# Patient Record
Sex: FEMALE | Race: WHITE | NOT HISPANIC OR LATINO | Employment: FULL TIME
[De-identification: names, ages, dates, MRNs, and addresses within clinical notes are randomized per-mention and may not be internally consistent; named-entity substitution may affect disease eponyms.]

---

## 2017-06-03 ENCOUNTER — HEALTH MAINTENANCE LETTER (OUTPATIENT)
Age: 52
End: 2017-06-03

## 2021-12-06 ENCOUNTER — NURSE TRIAGE (OUTPATIENT)
Dept: NURSING | Facility: CLINIC | Age: 56
End: 2021-12-06
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
## 2021-12-07 ENCOUNTER — HOSPITAL ENCOUNTER (EMERGENCY)
Facility: CLINIC | Age: 56
Discharge: HOME | End: 2021-12-07
Attending: EMERGENCY MEDICINE | Admitting: EMERGENCY MEDICINE
Payer: COMMERCIAL

## 2021-12-07 ENCOUNTER — APPOINTMENT (OUTPATIENT)
Dept: CT IMAGING | Facility: CLINIC | Age: 56
End: 2021-12-07
Attending: EMERGENCY MEDICINE
Payer: COMMERCIAL

## 2021-12-07 VITALS
SYSTOLIC BLOOD PRESSURE: 142 MMHG | TEMPERATURE: 98.9 F | RESPIRATION RATE: 18 BRPM | WEIGHT: 147 LBS | OXYGEN SATURATION: 99 % | DIASTOLIC BLOOD PRESSURE: 90 MMHG | HEART RATE: 85 BPM

## 2021-12-07 DIAGNOSIS — S09.90XA CLOSED HEAD INJURY, INITIAL ENCOUNTER: ICD-10-CM

## 2021-12-07 DIAGNOSIS — S16.1XXA ACUTE STRAIN OF NECK MUSCLE, INITIAL ENCOUNTER: ICD-10-CM

## 2021-12-07 PROCEDURE — 72125 CT NECK SPINE W/O DYE: CPT

## 2021-12-07 PROCEDURE — 70450 CT HEAD/BRAIN W/O DYE: CPT

## 2021-12-07 PROCEDURE — 99284 EMERGENCY DEPT VISIT MOD MDM: CPT | Mod: 25

## 2021-12-07 ASSESSMENT — ENCOUNTER SYMPTOMS
SPEECH DIFFICULTY: 0
ABDOMINAL PAIN: 0
CONFUSION: 0
NUMBNESS: 0
HEADACHES: 1
NECK PAIN: 1
WEAKNESS: 0
NAUSEA: 1
VOMITING: 0
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
## 2024-01-22 ENCOUNTER — HOSPITAL ENCOUNTER (OUTPATIENT)
Dept: MRI IMAGING | Facility: CLINIC | Age: 59
Discharge: HOME | End: 2024-01-22
Attending: PHYSICIAN ASSISTANT | Admitting: PHYSICIAN ASSISTANT
Payer: COMMERCIAL

## 2024-01-22 DIAGNOSIS — G89.29 OTHER CHRONIC PAIN: ICD-10-CM

## 2024-01-22 DIAGNOSIS — G89.29 CHRONIC ABDOMINAL PAIN: ICD-10-CM

## 2024-01-22 DIAGNOSIS — R10.9 CHRONIC ABDOMINAL PAIN: ICD-10-CM

## 2024-01-22 DIAGNOSIS — K52.9 CHRONIC DIARRHEA: ICD-10-CM

## 2024-01-22 DIAGNOSIS — Z87.19 HISTORY OF CROHN'S DISEASE: ICD-10-CM

## 2024-01-22 PROCEDURE — 250N000011 HC RX IP 250 OP 636

## 2024-01-22 PROCEDURE — 255N000002 HC RX 255 OP 636: Performed by: PHYSICIAN ASSISTANT

## 2024-01-22 PROCEDURE — 74183 MRI ABD W/O CNTR FLWD CNTR: CPT

## 2024-01-22 PROCEDURE — A9585 GADOBUTROL INJECTION: HCPCS | Performed by: PHYSICIAN ASSISTANT

## 2024-01-22 RX ORDER — GADOBUTROL 604.72 MG/ML
6.5 INJECTION INTRAVENOUS ONCE
Status: COMPLETED | OUTPATIENT
Start: 2024-01-22 | End: 2024-01-22

## 2024-01-22 RX ADMIN — GADOBUTROL 6.5 ML: 604.72 INJECTION INTRAVENOUS at 10:35

## 2024-01-22 RX ADMIN — GLUCAGON 1 MG: 1 INJECTION, POWDER, LYOPHILIZED, FOR SOLUTION INTRAMUSCULAR; INTRAVENOUS at 10:36

## 2025-04-26 ENCOUNTER — HOSPITAL ENCOUNTER (EMERGENCY)
Facility: CLINIC | Age: 60
Discharge: LEFT BEFORE DISCHARGE | End: 2025-04-26
Attending: PHYSICIAN ASSISTANT | Admitting: PHYSICIAN ASSISTANT
Payer: COMMERCIAL

## 2025-04-26 VITALS
OXYGEN SATURATION: 99 % | RESPIRATION RATE: 20 BRPM | DIASTOLIC BLOOD PRESSURE: 76 MMHG | SYSTOLIC BLOOD PRESSURE: 131 MMHG | HEART RATE: 90 BPM | TEMPERATURE: 97.9 F | HEIGHT: 67 IN | WEIGHT: 128.97 LBS | BODY MASS INDEX: 20.24 KG/M2

## 2025-04-26 DIAGNOSIS — R06.09 DYSPNEA ON EXERTION: ICD-10-CM

## 2025-04-26 DIAGNOSIS — R00.2 PALPITATIONS: ICD-10-CM

## 2025-04-26 PROCEDURE — 99282 EMERGENCY DEPT VISIT SF MDM: CPT

## 2025-04-26 ASSESSMENT — COLUMBIA-SUICIDE SEVERITY RATING SCALE - C-SSRS
2. HAVE YOU ACTUALLY HAD ANY THOUGHTS OF KILLING YOURSELF IN THE PAST MONTH?: NO
1. IN THE PAST MONTH, HAVE YOU WISHED YOU WERE DEAD OR WISHED YOU COULD GO TO SLEEP AND NOT WAKE UP?: NO
6. HAVE YOU EVER DONE ANYTHING, STARTED TO DO ANYTHING, OR PREPARED TO DO ANYTHING TO END YOUR LIFE?: NO

## 2025-04-26 NOTE — ED PROVIDER NOTES
Emergency Department Note      History of Present Illness     Chief Complaint   Palpitaions/SOB ( not abdominal pain) per patient      HPI   Lisa Washburn is a 59 year old female with a long standing history of Crohn's disease, chronic abdominal pain, and self reported short gut syndrome  presenting to the Emergency Department with concerns regarding heart palpitations. She has a history of recurrent Crohn's flare ups and at least a dozen surgeries with the majority of her colon resected due to crohns flares. Last significant surgery was 20 years ago as per document at recent visit on 3/18/25. She reports that she always has abdominal pain but developed a recent flare and was seen yesterday and started on steroids.  However her main concern today was regarding her electrolytes and her potassium. She reports she started having palpations and is concern she is having electrolyte abnormalities.     Patient reports palpitations, generalized weakness, shortness of breath, chills, and lightheadedness for the last 5 days. Symptoms are exacerbated by exertion. No chest pain, cough or fever. No vision changes, numbness. She has experienced similar symptoms previously due to low potassium. Clinical visit yesterday with a potassium of 3.3. Patient has a 20 year history of Crohn's disease and reports an increase in flare ups for the last month. Symptoms of this include abdominal pain, vomiting, and diarrhea. No blood with bowel movements. Denies fever or recent surgery. No history of cancer, diabetes, blood clots, heart problems, recent surgeries, or hypertension.     She had both upper and lower GI done in January 2024, both showed some Crohn's disease, but no acute treatment changes were suggested then.  They did talk about a biologic agent, but due to cost concerns documented in her chart she has not started this.    Per chart documentation she manages her pain with vicodin, gabapentin and occasional  "advil.    Patient's main concern is repletion her electrolytes.    Independent Historian   None    Review of External Notes   Office Visit 3/18/25  Office Visit: 4/26/25    Past Medical History     Medical History and Problem List   Crohn's disease  Borderline personality disorder  Short bowel syndrome  Hypokalemia  Chronic abdominal pain  Depression  Hyperlipidemia    Medications   Neurontin  Levothyroxine  Prednisone  Hydrocodone-acetaminophen    Surgical History   Colectomy  Small bowel resection  Reduce volvulus, hernia    Physical Exam     Patient Vitals for the past 24 hrs:   BP Temp Temp src Pulse Resp SpO2 Height Weight   04/26/25 0915 131/76 97.9  F (36.6  C) Temporal 90 20 99 % 1.702 m (5' 7\") 58.5 kg (128 lb 15.5 oz)     Physical Exam  Vitals and nursing note reviewed.   Constitutional:       Appearance: She is not diaphoretic.   HENT:      Head: Atraumatic.   Eyes:      General: No scleral icterus.     Extraocular Movements: Extraocular movements intact.   Cardiovascular:      Rate and Rhythm: Normal rate and regular rhythm.      Pulses:           Radial pulses are 2+ on the right side.      Heart sounds: S1 normal and S2 normal. No murmur heard.     No friction rub. No gallop.   Pulmonary:      Effort: Pulmonary effort is normal. No respiratory distress.      Breath sounds: Normal breath sounds. No stridor. No wheezing, rhonchi or rales.   Skin:     General: Skin is warm.      Coloration: Skin is not jaundiced.   Neurological:      Mental Status: She is alert and oriented to person, place, and time. Mental status is at baseline.      Cranial Nerves: No dysarthria or facial asymmetry.      Coordination: Coordination is intact.      Gait: Gait is intact.       Wingo through my exam the patient became more irritated and decided that she wanted to leave AGAINST MEDICAL ADVICE did not want me to continue with exam or the visit.  Thus I was unable to complete complete my full exam    Diagnostics     Lab " "Results   Labs Ordered and Resulted from Time of ED Arrival to Time of ED Departure - No data to display    Imaging   No orders to display       EKG       Independent Interpretation   None    ED Course      Medications Administered   Medications - No data to display    Procedures   Procedures     Discussion of Management   None    ED Course          Additional Documentation  None    Medical Decision Making / Diagnosis     CMS Diagnoses: None    MIPS       None    MDM   Lisa Washburn is a 59 year old female with history of chronic recurrent Crohn's with recent Crohn's flare with concerns regarding palpitation shortness of breath weakness.    Patient's main concern today was her potassium and electrolytes.  Although her potassium was 3.3 yesterday the rest of her electrolytes were within normal limits.  Partway through my exam of the patient I mention that I recommend a more extensive cardiac workup.  Patient visibly got more irritated and made the statement \"you are pissing me off\".  She explained that she does not want a cardiac workup that she just wants repletion of her electrolytes.  I agreed that I do want to recheck her electrolytes and treat as appropriate but I highly recommended further investigation of her shortness of breath palpitation the weakness that she has been experiencing.  Patient did not give me a clear explanation as to why she was upset.  I discussed with her that I do respect her wishes and I will not force any sort of testing that she does not want.  She abruptly decided to end the visitation and she understands by leaving this is against my medical advice.  I feel she is able to completely and confidently make her own medical decisions.  She understands the risks of delayed missed diagnosis and/or potentially miss life-threatening causes.  Patient signed AMA form.  And she understands she can return back anytime to the emergency department if she changes her mind we be more than happy " to evaluate her.  Otherwise I do recommend she follow-up with her primary doctor.    Disposition   The patient was discharged.     Diagnosis     ICD-10-CM    1. Palpitations  R00.2       2. Dyspnea on exertion  R06.09            Discharge Medications   There are no discharge medications for this patient.        Scribe Disclosure:  I, Chriss Patel, am serving as a scribe at 10:49 AM on 4/26/2025 to document services personally performed by Francisco Boyd PA-C based on my observations and the provider's statements to me.        Francisco Boyd PA-C  04/26/25 [id number]

## 2025-04-26 NOTE — ED TRIAGE NOTES
Pt arrives ambulatory into triage for abdominal pain related to a crohn's flare. Has been having diarrhea. Had labs done yesterday and noticed potassium was 3.3. Followed by [facility]. Is not currently on any regular treatment, however did start steroids yesterday.